# Patient Record
Sex: FEMALE | Race: WHITE | URBAN - METROPOLITAN AREA
[De-identification: names, ages, dates, MRNs, and addresses within clinical notes are randomized per-mention and may not be internally consistent; named-entity substitution may affect disease eponyms.]

---

## 2018-02-18 ENCOUNTER — HOSPITAL ENCOUNTER (OUTPATIENT)
Dept: MEDSURG UNIT | Facility: HOSPITAL | Age: 68
End: 2018-02-19
Attending: INTERNAL MEDICINE | Admitting: INTERNAL MEDICINE

## 2018-02-18 LAB
ABS NEUT (OLG): 6.89 X10(3)/MCL (ref 2.1–9.2)
ALBUMIN SERPL-MCNC: 3.5 GM/DL (ref 3.4–5)
ALBUMIN/GLOB SERPL: 0.9 RATIO (ref 1.1–2)
ALP SERPL-CCNC: 70 UNIT/L (ref 38–126)
ALT SERPL-CCNC: 30 UNIT/L (ref 12–78)
APPEARANCE, UA: NORMAL
AST SERPL-CCNC: 31 UNIT/L (ref 15–37)
BACTERIA SPEC CULT: NORMAL /HPF
BASOPHILS # BLD AUTO: 0 X10(3)/MCL (ref 0–0.2)
BASOPHILS NFR BLD AUTO: 0 %
BILIRUB SERPL-MCNC: 0.7 MG/DL (ref 0.2–1)
BILIRUB UR QL STRIP: NEGATIVE
BILIRUBIN DIRECT+TOT PNL SERPL-MCNC: 0.1 MG/DL (ref 0–0.5)
BILIRUBIN DIRECT+TOT PNL SERPL-MCNC: 0.6 MG/DL (ref 0–0.8)
BUN SERPL-MCNC: 15 MG/DL (ref 7–18)
CALCIUM SERPL-MCNC: 8.5 MG/DL (ref 8.5–10.1)
CHLORIDE SERPL-SCNC: 103 MMOL/L (ref 98–107)
CO2 SERPL-SCNC: 25 MMOL/L (ref 21–32)
COLOR UR: YELLOW
CREAT SERPL-MCNC: 0.62 MG/DL (ref 0.55–1.02)
EOSINOPHIL # BLD AUTO: 0.1 X10(3)/MCL (ref 0–0.9)
EOSINOPHIL NFR BLD AUTO: 1 %
ERYTHROCYTE [DISTWIDTH] IN BLOOD BY AUTOMATED COUNT: 12.8 % (ref 11.5–17)
GLOBULIN SER-MCNC: 3.9 GM/DL (ref 2.4–3.5)
GLUCOSE (UA): NEGATIVE
GLUCOSE SERPL-MCNC: 126 MG/DL (ref 74–106)
HCT VFR BLD AUTO: 35.9 % (ref 37–47)
HCT VFR BLD AUTO: 39.4 % (ref 37–47)
HGB BLD-MCNC: 11.8 GM/DL (ref 12–16)
HGB BLD-MCNC: 12.9 GM/DL (ref 12–16)
HGB UR QL STRIP: NEGATIVE
KETONES UR QL STRIP: NEGATIVE
LEUKOCYTE ESTERASE UR QL STRIP: NEGATIVE
LYMPHOCYTES # BLD AUTO: 1.6 X10(3)/MCL (ref 0.6–4.6)
LYMPHOCYTES NFR BLD AUTO: 17 %
MCH RBC QN AUTO: 28.9 PG (ref 27–31)
MCHC RBC AUTO-ENTMCNC: 32.7 GM/DL (ref 33–36)
MCV RBC AUTO: 88.1 FL (ref 80–94)
MONOCYTES # BLD AUTO: 0.8 X10(3)/MCL (ref 0.1–1.3)
MONOCYTES NFR BLD AUTO: 8 %
NEUTROPHILS # BLD AUTO: 6.89 X10(3)/MCL (ref 1.4–7.9)
NEUTROPHILS NFR BLD AUTO: 73 %
NITRITE UR QL STRIP: NEGATIVE
PH UR STRIP: 6 [PH] (ref 5–9)
PLATELET # BLD AUTO: 204 X10(3)/MCL (ref 130–400)
PMV BLD AUTO: 11.5 FL (ref 9.4–12.4)
POTASSIUM SERPL-SCNC: 3.8 MMOL/L (ref 3.5–5.1)
PROT SERPL-MCNC: 7.4 GM/DL (ref 6.4–8.2)
PROT UR QL STRIP: NEGATIVE
RBC # BLD AUTO: 4.47 X10(6)/MCL (ref 4.2–5.4)
RBC #/AREA URNS HPF: NORMAL /[HPF]
SODIUM SERPL-SCNC: 138 MMOL/L (ref 136–145)
SP GR UR STRIP: 1.02 (ref 1–1.03)
SQUAMOUS EPITHELIAL, UA: NORMAL
UROBILINOGEN UR STRIP-ACNC: 0.2
WBC # SPEC AUTO: 9.4 X10(3)/MCL (ref 4.5–11.5)
WBC #/AREA URNS HPF: NORMAL /[HPF]

## 2018-02-19 LAB
BUN SERPL-MCNC: 11 MG/DL (ref 7–18)
CALCIUM SERPL-MCNC: 8.1 MG/DL (ref 8.5–10.1)
CHLORIDE SERPL-SCNC: 107 MMOL/L (ref 98–107)
CO2 SERPL-SCNC: 26 MMOL/L (ref 21–32)
CREAT SERPL-MCNC: 0.62 MG/DL (ref 0.55–1.02)
ERYTHROCYTE [DISTWIDTH] IN BLOOD BY AUTOMATED COUNT: 13.1 % (ref 11.5–17)
GLUCOSE SERPL-MCNC: 94 MG/DL (ref 74–106)
HCT VFR BLD AUTO: 32.6 % (ref 37–47)
HGB BLD-MCNC: 10.9 GM/DL (ref 12–16)
MCH RBC QN AUTO: 28.3 PG (ref 27–31)
MCHC RBC AUTO-ENTMCNC: 33.4 GM/DL (ref 33–36)
MCV RBC AUTO: 84.7 FL (ref 80–94)
PLATELET # BLD AUTO: 171 X10(3)/MCL (ref 130–400)
PMV BLD AUTO: 11.8 FL (ref 9.4–12.4)
POTASSIUM SERPL-SCNC: 3.6 MMOL/L (ref 3.5–5.1)
RBC # BLD AUTO: 3.85 X10(6)/MCL (ref 4.2–5.4)
SODIUM SERPL-SCNC: 142 MMOL/L (ref 136–145)
WBC # SPEC AUTO: 7.8 X10(3)/MCL (ref 4.5–11.5)

## 2018-02-23 ENCOUNTER — HOSPITAL ENCOUNTER (OUTPATIENT)
Dept: MEDSURG UNIT | Facility: HOSPITAL | Age: 68
End: 2018-02-26
Attending: HOSPITALIST | Admitting: HOSPITALIST

## 2018-02-23 LAB
ABS NEUT (OLG): 9.42 X10(3)/MCL (ref 2.1–9.2)
APTT PPP: 27.5 SECOND(S) (ref 24.8–36.9)
BASOPHILS # BLD AUTO: 0 X10(3)/MCL (ref 0–0.2)
BASOPHILS NFR BLD AUTO: 0 %
EOSINOPHIL # BLD AUTO: 0.1 X10(3)/MCL (ref 0–0.9)
EOSINOPHIL NFR BLD AUTO: 1 %
ERYTHROCYTE [DISTWIDTH] IN BLOOD BY AUTOMATED COUNT: 13.1 % (ref 11.5–17)
HCT VFR BLD AUTO: 40.3 % (ref 37–47)
HGB BLD-MCNC: 13.4 GM/DL (ref 12–16)
INR PPP: 1.03 (ref 0–1.27)
LYMPHOCYTES # BLD AUTO: 2.1 X10(3)/MCL (ref 0.6–4.6)
LYMPHOCYTES NFR BLD AUTO: 16 %
MCH RBC QN AUTO: 28.2 PG (ref 27–31)
MCHC RBC AUTO-ENTMCNC: 33.3 GM/DL (ref 33–36)
MCV RBC AUTO: 84.8 FL (ref 80–94)
MONOCYTES # BLD AUTO: 1.2 X10(3)/MCL (ref 0.1–1.3)
MONOCYTES NFR BLD AUTO: 9 %
NEUTROPHILS # BLD AUTO: 9.42 X10(3)/MCL (ref 2.1–9.2)
NEUTROPHILS NFR BLD AUTO: 73 %
PLATELET # BLD AUTO: 223 X10(3)/MCL (ref 130–400)
PMV BLD AUTO: 11.4 FL (ref 9.4–12.4)
PROTHROMBIN TIME: 13.8 SECOND(S) (ref 12.2–14.7)
RBC # BLD AUTO: 4.75 X10(6)/MCL (ref 4.2–5.4)
WBC # SPEC AUTO: 12.9 X10(3)/MCL (ref 4.5–11.5)

## 2018-02-24 LAB
ABS NEUT (OLG): 8.72 X10(3)/MCL (ref 2.1–9.2)
BASOPHILS # BLD AUTO: 0 X10(3)/MCL (ref 0–0.2)
BASOPHILS NFR BLD AUTO: 0 %
EOSINOPHIL # BLD AUTO: 0.2 X10(3)/MCL (ref 0–0.9)
EOSINOPHIL NFR BLD AUTO: 2 %
ERYTHROCYTE [DISTWIDTH] IN BLOOD BY AUTOMATED COUNT: 13.2 % (ref 11.5–17)
HCT VFR BLD AUTO: 36 % (ref 37–47)
HGB BLD-MCNC: 11.8 GM/DL (ref 12–16)
LYMPHOCYTES # BLD AUTO: 1.8 X10(3)/MCL (ref 0.6–4.6)
LYMPHOCYTES NFR BLD AUTO: 15 %
MCH RBC QN AUTO: 29.1 PG (ref 27–31)
MCHC RBC AUTO-ENTMCNC: 32.8 GM/DL (ref 33–36)
MCV RBC AUTO: 88.7 FL (ref 80–94)
MONOCYTES # BLD AUTO: 1.1 X10(3)/MCL (ref 0.1–1.3)
MONOCYTES NFR BLD AUTO: 10 %
NEUTROPHILS # BLD AUTO: 8.72 X10(3)/MCL (ref 1.4–7.9)
NEUTROPHILS NFR BLD AUTO: 73 %
PLATELET # BLD AUTO: 209 X10(3)/MCL (ref 130–400)
PMV BLD AUTO: 11.3 FL (ref 9.4–12.4)
RBC # BLD AUTO: 4.06 X10(6)/MCL (ref 4.2–5.4)
WBC # SPEC AUTO: 11.9 X10(3)/MCL (ref 4.5–11.5)

## 2019-12-20 ENCOUNTER — HOSPITAL ENCOUNTER (OUTPATIENT)
Dept: MEDSURG UNIT | Facility: HOSPITAL | Age: 69
End: 2019-12-23
Attending: INTERNAL MEDICINE | Admitting: INTERNAL MEDICINE

## 2019-12-20 LAB
ABS NEUT (OLG): 8.74 X10(3)/MCL (ref 2.1–9.2)
APTT PPP: 29.8 SECOND(S) (ref 24.2–33.9)
BASOPHILS # BLD AUTO: 0 X10(3)/MCL (ref 0–0.2)
BASOPHILS NFR BLD AUTO: 0 %
EOSINOPHIL # BLD AUTO: 0 X10(3)/MCL (ref 0–0.9)
EOSINOPHIL NFR BLD AUTO: 0 %
ERYTHROCYTE [DISTWIDTH] IN BLOOD BY AUTOMATED COUNT: 12.4 % (ref 11.5–17)
HCT VFR BLD AUTO: 40.7 % (ref 37–47)
HGB BLD-MCNC: 13.2 GM/DL (ref 12–16)
INR PPP: 1 (ref 0–1.3)
LYMPHOCYTES # BLD AUTO: 1.3 X10(3)/MCL (ref 0.6–4.6)
LYMPHOCYTES NFR BLD AUTO: 12 %
MCH RBC QN AUTO: 28.7 PG (ref 27–31)
MCHC RBC AUTO-ENTMCNC: 32.4 GM/DL (ref 33–36)
MCV RBC AUTO: 88.5 FL (ref 80–94)
MONOCYTES # BLD AUTO: 0.3 X10(3)/MCL (ref 0.1–1.3)
MONOCYTES NFR BLD AUTO: 3 %
NEUTROPHILS # BLD AUTO: 8.74 X10(3)/MCL (ref 2.1–9.2)
NEUTROPHILS NFR BLD AUTO: 84 %
PLATELET # BLD AUTO: 202 X10(3)/MCL (ref 130–400)
PMV BLD AUTO: 11.7 FL (ref 9.4–12.4)
PROTHROMBIN TIME: 12.9 SECOND(S) (ref 12–14)
RBC # BLD AUTO: 4.6 X10(6)/MCL (ref 4.2–5.4)
WBC # SPEC AUTO: 10.4 X10(3)/MCL (ref 4.5–11.5)

## 2019-12-21 LAB
ABS NEUT (OLG): 6.39 X10(3)/MCL (ref 2.1–9.2)
ALBUMIN SERPL-MCNC: 3.5 GM/DL (ref 3.4–5)
ALBUMIN/GLOB SERPL: 1 RATIO (ref 1.1–2)
ALP SERPL-CCNC: 64 UNIT/L (ref 38–126)
ALT SERPL-CCNC: 31 UNIT/L (ref 12–78)
APTT PPP: 31 SECOND(S) (ref 24.2–33.9)
AST SERPL-CCNC: 24 UNIT/L (ref 15–37)
BASOPHILS # BLD AUTO: 0 X10(3)/MCL (ref 0–0.2)
BASOPHILS NFR BLD AUTO: 0 %
BILIRUB SERPL-MCNC: 0.5 MG/DL (ref 0.2–1)
BILIRUBIN DIRECT+TOT PNL SERPL-MCNC: 0.1 MG/DL (ref 0–0.5)
BILIRUBIN DIRECT+TOT PNL SERPL-MCNC: 0.4 MG/DL (ref 0–0.8)
BUN SERPL-MCNC: 17 MG/DL (ref 7–18)
CALCIUM SERPL-MCNC: 8.9 MG/DL (ref 8.5–10.1)
CHLORIDE SERPL-SCNC: 104 MMOL/L (ref 98–107)
CO2 SERPL-SCNC: 27 MMOL/L (ref 21–32)
CREAT SERPL-MCNC: 0.66 MG/DL (ref 0.55–1.02)
EOSINOPHIL # BLD AUTO: 0 X10(3)/MCL (ref 0–0.9)
EOSINOPHIL NFR BLD AUTO: 0 %
ERYTHROCYTE [DISTWIDTH] IN BLOOD BY AUTOMATED COUNT: 12.5 % (ref 11.5–17)
GLOBULIN SER-MCNC: 3.5 GM/DL (ref 2.4–3.5)
GLUCOSE SERPL-MCNC: 118 MG/DL (ref 74–106)
HCT VFR BLD AUTO: 38.4 % (ref 37–47)
HGB BLD-MCNC: 12.2 GM/DL (ref 12–16)
INR PPP: 1 (ref 0–1.3)
LYMPHOCYTES # BLD AUTO: 2.4 X10(3)/MCL (ref 0.6–4.6)
LYMPHOCYTES NFR BLD AUTO: 25 %
MCH RBC QN AUTO: 28.1 PG (ref 27–31)
MCHC RBC AUTO-ENTMCNC: 31.8 GM/DL (ref 33–36)
MCV RBC AUTO: 88.5 FL (ref 80–94)
MONOCYTES # BLD AUTO: 0.8 X10(3)/MCL (ref 0.1–1.3)
MONOCYTES NFR BLD AUTO: 8 %
NEUTROPHILS # BLD AUTO: 6.39 X10(3)/MCL (ref 2.1–9.2)
NEUTROPHILS NFR BLD AUTO: 65 %
PLATELET # BLD AUTO: 183 X10(3)/MCL (ref 130–400)
PMV BLD AUTO: 11.7 FL (ref 9.4–12.4)
POTASSIUM SERPL-SCNC: 4.1 MMOL/L (ref 3.5–5.1)
PROT SERPL-MCNC: 7 GM/DL (ref 6.4–8.2)
PROTHROMBIN TIME: 13.4 SECOND(S) (ref 12–14)
RBC # BLD AUTO: 4.34 X10(6)/MCL (ref 4.2–5.4)
SODIUM SERPL-SCNC: 140 MMOL/L (ref 136–145)
WBC # SPEC AUTO: 9.8 X10(3)/MCL (ref 4.5–11.5)

## 2019-12-23 LAB
ERYTHROCYTE [DISTWIDTH] IN BLOOD BY AUTOMATED COUNT: 12.8 % (ref 11.5–17)
HCT VFR BLD AUTO: 39.2 % (ref 37–47)
HGB BLD-MCNC: 12.4 GM/DL (ref 12–16)
MCH RBC QN AUTO: 28.6 PG (ref 27–31)
MCHC RBC AUTO-ENTMCNC: 31.6 GM/DL (ref 33–36)
MCV RBC AUTO: 90.5 FL (ref 80–94)
PLATELET # BLD AUTO: 204 X10(3)/MCL (ref 130–400)
PMV BLD AUTO: 11.7 FL (ref 9.4–12.4)
RBC # BLD AUTO: 4.33 X10(6)/MCL (ref 4.2–5.4)
WBC # SPEC AUTO: 9.1 X10(3)/MCL (ref 4.5–11.5)

## 2022-04-30 NOTE — ED PROVIDER NOTES
"   Patient:   Sandor Rea            MRN: [id number]            FIN: [id number]               Age:   67 years     Sex:  Female     :  1950   Associated Diagnoses:   Epistaxis   Author:   Yolette LEVIN, Jerri Blair      Basic Information   Time seen: Date & time 2018 08:57:00.   History source: Patient, friend.   Arrival mode: Private vehicle.   History limitation: None.   Additional information: Chief Complaint from Nursing Triage Note : Chief Complaint   2018 7:35 CST       Chief Complaint           pt reports she has been bleeding out of eyes and nose x 1 week, recently prescribed BP medicine by an ER and was told by PCP to stop taking it "because she did not like it", reports vomiting blood clots since this morning, denies thinners  .      History of Present Illness   The patient presents with 68 y/o F presents to the ED with friend at bedside c/o of epistaxis x 1 week. Nose currently not actively bleeding however she reports dried blood clots in the back of her throat. She has been evaluated for the same symptoms twice this past week at [facility] ED as well as by her PCP, but continues to have reported nose bleeds. She is also reporting nausea that she attributes to swallowing blood. At her initial ED visit her BP was high. She was started on Clonidine but is only taking it for SBP >150 mmHg per instruction of her PCP. She has no history of HTN otherwise. She took one dose of clonidine this morning when her nose began to ooze. She applied pressure, sprayed afrin then returned to the ED here for evaluation. Again, her nose is currently not bleeding. She had a similar issue in the past requiring surgery by her ENT in [place], Dr. Dakin. She is not on anticoagualation therapy and denies any other abnormal bleeding. She reported at triage that blood was also coming from her eyes, however this was earlier in the week with the initial nosebleed, not currenlty. She reports feeling lightheaded at " this time but attributes that to being hungry. No syncope, CP, SOB or other acute issues..  The onset was 6  days ago.  The course/duration of symptoms is improving and episodic: 3  total episodes.  The character of symptoms is bloody.  The degree at present is moderate.  The exacerbating factor is none.  The relieving factor is none.  Risk factors consist of none.  Therapy today: clonidine, direct pressure, afrin.  Associated symptoms: dizziness and nausea.  Additional history: none.        Review of Systems   Constitutional symptoms:  No fever,    Skin symptoms:  No rash,    Eye symptoms:  Vision unchanged, No recent vision problems,    ENMT symptoms:  No sore throat, Nose: Bleeding.   Respiratory symptoms:  No shortness of breath,    Cardiovascular symptoms:  No chest pain, no syncope.    Gastrointestinal symptoms:  Nausea, no abdominal pain, no vomiting, no diarrhea, no constipation, no rectal bleeding.    Genitourinary symptoms:  No dysuria, no hematuria, no vaginal bleeding.    Neurologic symptoms:  Dizziness, No headache,    Psychiatric symptoms:  Anxiety.   Hematologic/Lymphatic symptoms:  Bleeding tendency negative,       Health Status   Allergies:    Allergic Reactions (All)  Severity Not Documented  Codeine- Rash.  Percodan- Rash.  Sulfa drugs- Rash.  Canceled/Inactive Reactions (All)  No Known Allergies.   Medications:  (Selected)   Inpatient Medications  Ordered  IVF Normal Saline NS Infusion 1,000 mL: 1,000 mL, 1,000 mL, IV, 30 mL/hr, start date 02/18/18 9:04:00 CST  Prescriptions  Prescribed  cloniDINE 0.1 mg oral tablet: 0.1 mg = 1 tab(s), Oral, QID, PRN PRN hypertension, use 1 tablet every 6 hours as needed if systolic blood pressure is greater than 150, # 20 tab(s), 2 Refill(s).      Past Medical/ Family/ Social History   Medical history: Negative.   Surgical history: Negative.   Family history: Not significant.   Social history: Alcohol use: Denies, Tobacco use: Denies.      Physical Examination                Vital Signs   Vital Signs   2/18/2018 8:11 CST       Peripheral Pulse Rate     89 bpm                             Respiratory Rate          18 br/min                             SpO2                      95 %                             Oxygen Therapy            Room air                             Systolic Blood Pressure   121 mmHg                             Diastolic Blood Pressure  71 mmHg                             Mean Arterial Pressure, Cuff              88 mmHg    2/18/2018 8:10 CST       SpO2                      95 %    2/18/2018 7:35 CST       Temperature Oral          36.7 DegC                             Temperature Oral (calculated)             98.06 DegF                             Peripheral Pulse Rate     91 bpm                             Respiratory Rate          18 br/min                             SpO2                      96 %                             Oxygen Therapy            Room air                             Systolic Blood Pressure   139 mmHg                             Diastolic Blood Pressure  86 mmHg  .   Measurements   2/18/2018 7:35 CST       Weight Dosing             90.5 kg                             Weight Measured and Calculated in Lbs     199.52 lb                             Weight Estimated          90.5 kg                             Height/Length Dosing      173 cm                             Height/Length Estimated   173 cm                             Body Mass Index Estimated 30.24 kg/m2  .   Basic Oxygen Information   2/18/2018 8:11 CST       SpO2                      95 %                             Oxygen Therapy            Room air    2/18/2018 8:10 CST       SpO2                      95 %    2/18/2018 7:35 CST       SpO2                      96 %                             Oxygen Therapy            Room air  .   General:  Alert, no acute distress, anxious.    Skin:  Warm, dry, pink, intact, no pallor, no rash.    Head:  Normocephalic, atraumatic.     Neck:  Supple, trachea midline.    Eye:  Extraocular movements are intact, normal conjunctiva.    Ears, nose, mouth and throat:  Oral mucosa moist, lips are dry, Nose: Right naris, scab to anterior aspect, Throat: Pharynx, scant amount of dried blood to posterior pharynx without active bleeding noted. Patient noted to continue to wipe her nose.    Cardiovascular:  Regular rate and rhythm, Normal peripheral perfusion.    Respiratory:  Lungs are clear to auscultation, respirations are non-labored, breath sounds are equal, Symmetrical chest wall expansion.    Gastrointestinal:  Soft, Nontender, Non distended, Normal bowel sounds.    Back:  Normal range of motion.   Musculoskeletal:  Normal ROM.   Neurological:  Alert and oriented to person, place, time, and situation, No focal neurological deficit observed, normal sensory observed, normal motor observed.    Psychiatric:  Cooperative.      Medical Decision Making   Differential Diagnosis:  Nausea, gastritis, bowel obstruction, dehydration, electrolyte abnormality, HTN with epistaxis, traumatic epistaxis and others. .    Rationale:  66 yo F with multiple visits for mild epistaxis that appears to be from an anterior source in the right nostril. Bleeding currentlly controlled. She is reporting nausea and lightheadedness. Repeat CBC obtained to assess for acute anemia although it is unlikely. Labs and coagulation studies obtained the day prior to presentation to me and are wnl. Repeat Hb here stable. Patient given zofran for nausea and observed for a period of time for recurrent bleeding. BP also monitored. Reassess.   Documents reviewed:  Emergency department nurses' notes.   Orders  Laboratory    CBC w/ Auto Diff, Yolette LEVIN, Jerri Blair, 02/18/18, 09:04, Ordered    CMP, Jerri De La Vega MD, 02/18/18, 09:04, Ordered    Urinalysis Complete a reflex to culture, Jerri De La Vega MD, 02/18/18, 09:04, Ordered  EKG / Respiratory / Ancillary    EKG, Yolette LEVIN,  Jerri Blair, 02/18/18, 09:16, Ordered .   Electrocardiogram:  Time 2/18/2018 09:16:00, rate 81, normal sinus rhythm, No ST-T changes, no ectopy, EP Interp, minimally shortened FL interval, otherwise intervals wnl.    Results review:  Lab results : Lab View   2/19/2018 5:10 CST       Sodium Lvl                142 mmol/L                             Potassium Lvl             3.6 mmol/L                             Chloride                  107 mmol/L                             CO2                       26.0 mmol/L                             Calcium Lvl               8.1 mg/dL  LOW                             Glucose Lvl               94 mg/dL                             BUN                       11.0 mg/dL                             Creatinine                0.62 mg/dL                             eGFR-AA                   >60 mL/min/1.73 m2  NA                             eGFR-MALVIN                  >60 mL/min/1.73 m2  NA                             WBC                       7.8 x10(3)/mcL                             RBC                       3.85 x10(6)/mcL  LOW                             Hgb                       10.9 gm/dL  LOW                             Hct                       32.6 %  LOW                             Platelet                  171 x10(3)/mcL                             MCV                       84.7 fL                             MCH                       28.3 pg                             MCHC                      33.4 gm/dL                             RDW                       13.1 %                             MPV                       11.8 fL  .   Abdominal/KUB X-ray  Nonspecific bowel gas pattern, interpretation by Emergency Physician.       Reexamination/ Reevaluation   Time: 2/18/2018 10:00:00 .   Course: unchanged.   Assessment: exam unchanged.   Notes: Patient still reporting nausea. Asking for water. No bleeding noted. She is concerned about going home regarding continued nose bleed  and uncertainty about her BP. BP has been controlled here without intervention. She can see her PCP Monday for further evaluation and is advised to do so. I did contact Dr. Mccormick, on call for Dr. Dakin, regarding her case to  ensure ENT follow-up and Dr. Mccormick assures me they can see her first thing Monday morning. He recommends placing anterior nasal packing for the evening and he or Dr. Dakin will re-evaluate and remove it in the morning. Packing was placed in the right naris without complication. Will attempt PO challenge..   Time: 2/18/2018 12:00:00 .   Course: unchanged.   Assessment: exam unchanged.   Notes: Patient still reporting nausea. She drank a sip of water and tolerated it but does not want to drink any more secondary to nausea. She remains hemodynamically stable without bleeding. IV initiated with IVF and phenergan given. Reassess..   Time: 2/18/2018 13:00:00 .   Notes: Patient still refusing to drink. Again, she remains hemodynamically stable. Scant fresh blood noted to the posterior pharynx. As she is not tolerating PO, I consulted Dr. Blaise Cordoba with ENT as well as hospital medicine for admission for observation of nose bleed along with hydration. I don't believe she will have bleeding recurrence and I don't believe her nausea is secondary to the packing. She remains afebrile with a soft, nontender abdomen. AXR obtained in an exercise of caution to ensure no obstruction. It is negative for such. Medicine agrees to admit and Dr. Cordoba will be available for consultation in the case she bleeds or has other issues with packing. Patient's BP will also be monitored. She is amenable to admission.      Impression and Plan   Diagnosis   Epistaxis (LIT79-GQ R04.0)   intractable nausea      Calls-Consults   -  2/18/2018 10:05:00 , Dakin MD, Kim.    -  2/18/2018 13:05:00 , Adalid Garcia MD, shankar Leary.    -  2/18/2018 13:10:00 , Marjorie Pinzon MD, paged.    -  2/18/2018 13:30:00 , Marjorie Pinzon MD  shankar.    Plan   Condition: Stable.    Disposition: Admit time  2/18/2018 13:00:00, Place in Observation Unit, Ayde Carranza MD.    Counseled: Patient, Friend, Regarding diagnosis, Regarding diagnostic results, Regarding treatment plan, Patient indicated understanding of instructions.    Notes: I, Gabriella Martinez, acted solely as a scribe for and in the presence of Dr. De La Vega who performed the service., I, Dr. Jerri De La Vega, personally evaluated and examined this patient and agree with the documentation as above. .

## 2022-04-30 NOTE — ED PROVIDER NOTES
"   Patient:   Sandor Rea            MRN: [id number]            FIN: [id number]               Age:   69 years     Sex:  Female     :  1950   Associated Diagnoses:   Acute anterior epistaxis; N&V (nausea and vomiting)   Author:   Kary Nixon MD      Basic Information   Time seen: Date & time 2019 17:55:00.   History source: Patient.   Arrival mode: Ambulance.   History limitation: None.   Additional information: Patient's physician(s): Adalid Garcia MD, Austin VILLEGAS, Dr. Hooks, Chief Complaint from Nursing Triage Note : Chief Complaint   2019 17:44 CST     Chief Complaint           nose bleed since 1640; denies blood thinners, denies trauma  .      History of Present Illness   The patient presents with epistaxis, Pt brought to Ed by EMS for nose bleed that began about one hour ago.  Pt with hx of post bleed several years ago.  Not on any thinners.  (Carlito CHILEL) and     I, Dr. Nixon, assumed care of this patient at 1915.    70 y/o CF with history of HTN presents to ED with epistaxis onset approximately 1640. Pt states that she has experienced nosebleeds before out of the right nostril. Pt states that in the past they had to pack the nostril and that they used a "foam" that stopped the bleeding. Today's therapy included applying pressure for 3 hours which did not work and then doing two sprays of Afrin in each nostril, which also did not work. Pt denies blowing nose or picking at her nose. .  The onset was 3  hours ago.  The course/duration of symptoms is constant.  Location: right. Type of injury: none.  The degree at onset was moderate.  The degree at present is moderate.  Risk factors consist of hypertension.  Prior episodes: occasional.  Therapy today: emergency medical services and see nurses notes.  Associated symptoms: none.  Additional history: none.        Review of Systems   Constitutional symptoms:  No fever, no chills, no sweats   Skin symptoms:  No rash, no petechiae.    Eye " symptoms:  Vision unchanged, no pain, no discharge, no icterus, no diplopia, no blurred vision, no blindness.    ENMT symptoms:  No ear pain, no sore throat, no nasal congestion, no sinus painNose: Bleeding.   Respiratory symptoms:  No shortness of breath, no orthopnea, no cough, no hemoptysis, no stridor, no wheezing.    Cardiovascular symptoms:  No chest pain, no palpitations, no syncope, no diaphoresis, no peripheral edema.    Gastrointestinal symptoms:  No abdominal pain, no nausea, no vomiting, no diarrhea, no constipation, no rectal bleeding, no rectal pain.    Genitourinary symptoms:  No dysuria, no hematuria.    Musculoskeletal symptoms:  No back pain, no Muscle pain.    Neurologic symptoms:  No headache, no dizziness, no altered level of consciousness, no numbness, no tingling   Psychiatric symptoms:  Negative except as documented in HPI.   Endocrine symptoms:  Negative except as documented in HPI.   Hematologic/Lymphatic symptoms:  Negative except as documented in HPI      Health Status   Allergies:    Allergic Reactions (Selected)  Severity Not Documented  Codeine- Rash.  Opioid-like analgesics- Hives.  Percodan- Rash.  Sulfa drugs- Rash..   Medications:  (Selected)   Prescriptions  Prescribed  Flomax 0.4 mg oral capsule: 0.4 mg = 1 cap(s), Oral, Daily, # 15 cap(s), 0 Refill(s), Pharmacy: [place] [facility] [id number]  Mobic 7.5 mg oral tablet: 7.5 mg = 1 tab(s), Oral, q12hr, PRN PRN pain, # 20 tab(s), 0 Refill(s), Pharmacy: [id number]  Documented Medications  Documented  MiraLax (polyethylene glycol 3350): 17 gm = 1 packet(s), Oral, BID, 0 Refill(s)  Zofran 4 mg oral tablet: 4 mg = 1 tab(s), Oral, q8hr, PRN PRN as needed for nausea/vomiting, 0 Refill(s)  alPRAzolam 0.5 mg oral tablet: 0.5 mg = 1 tab(s), Oral, TID, 0 Refill(s)  amlodipine-benazepril 5 mg-10 mg oral capsule: 1 cap(s), Oral, Daily, 0 Refill(s)  cloniDINE 0.1 mg oral tablet: PRN for BP > 160/100, 0 Refill(s), per  nurse's notes.      Past Medical/ Family/ Social History   Medical history:    No active or resolved past medical history items have been selected or recorded..   Surgical history:    sx for nose bleeds.   delivery ([id number]).  right arm sx..   Family history:    No family history items have been selected or recorded..   Social history: Tobacco use: Denies.   Problem list:    No qualifying data available  .      Physical Examination               Vital Signs   Vital Signs   2019 17:44 CST     Temperature Temporal Artery               36.6 DegC                             Peripheral Pulse Rate     112 bpm  HI                             Respiratory Rate          18 br/min                             SpO2                      99 %                             Oxygen Therapy            Room air                             Systolic Blood Pressure   186 mmHg  HI                             Diastolic Blood Pressure  119 mmHg  HI  .   Measurements   2019 17:44 CST     Weight Dosing             85 kg                             Weight Measured and Calculated in Lbs     187.39 lb                             Weight Estimated          85 kg                             Height/Length Dosing      172 cm                             Height/Length Estimated   172 cm                             Body Mass Index Estimated 28.73 kg/m2  .   General:  Alert, no acute distress.    Neurological:  Alert and oriented to person, place, time, and situation, No focal neurological deficit observed   Skin:  Warm, dry, intact   Head:  Normocephalic, atraumatic.    Neck:  Supple, trachea midline, no tenderness.    Eye:  Pupils are equal, round and reactive to light, extraocular movements are intact   , continued oozing from right nares. Respiratory:  Lungs are clear to auscultation, respirations are non-labored   Cardiovascular:  Regular rate and rhythm, No murmur, Normal peripheral perfusion.    Back:  Nontender, Normal range  of motion   Musculoskeletal:  Normal ROM, normal strength.    Gastrointestinal:  Soft, Nontender   Psychiatric:  Cooperative, appropriate mood & affect.       Medical Decision Making   Differential Diagnosis:  Anterior epistaxis, hypertension.    Rationale:  pt presenting with epistaxis, has had similar symptoms in the past that sarina required admit. at this time bleeding ahs been controlled with packing. .   Documents reviewed:  Emergency department nurses' notes, emergency department records, prior records.    Orders  Launch Order Profile (Selected)   Inpatient Orders  Ordered  neosynephrine: 12/20/19 18:00:00 CST, neosynephrine  Completed  PT ([facility]): STAT collect, 12/20/19 18:48:52 CST, BLOOD, Collected, Stop date 12/20/19 18:48:00 CST, Lab Collect  PTT: STAT collect, 12/20/19 18:48:52 CST, BLOOD, Collected, Stop date 12/20/19 18:48:00 CST, Lab Collect  ondansetron INJ: 4 mg, 2 mL, Injection, N/A, Once, Stop date 12/20/19 19:10:15 CST, Physician Stop, 12/20/19 19:10:15 CST  ondansetron: 4 mg, form: Injection, IV, AdHoc, first dose 12/20/19 19:12:00 CST, stop date 12/20/19 19:12:00 CST  oxymetazoline nasal: 2 spray(s), Spray, N/A, Once, Stop date 12/20/19 18:25:39 CST, Physician Stop, 12/20/19 18:25:39 CST  oxymetazoline nasal: 2 spray(s), form: Spray, Both Nostrils, AdHoc, first dose 12/20/19 18:28:00 CST, stop date 12/20/19 18:28:00 CST.   Results review:  Lab results : Lab View   12/20/2019 18:48 CST     PT                        12.9 second(s)                             INR                       1.0                             PTT                       29.8 second(s)  .      Reexamination/ Reevaluation   Time: 12/20/2019 21:51:00 .   Notes: no further bleeding from right nare, will continue to monitor due to patient's significant concern regarding rbebleeding, did have emesis of swallowed blood that did not result in further bleeding.   Time: 12/20/2019 23:07:00 .   Notes: patient has not had more  bleeding, she states she is so nauseous from swallowed blood and does not feel comfortable trying to drink or tolerate po and does not feel comfortable with dc.      Procedure   Nose bleed control procedure   Time: 12/20/2019 20:00:00 .    Confirmed: Patient, procedure, side, and site correct.    Consent: Patient, Has given verbal consent.    Location of bleeding: Right nasal canal, Anteriorly.    Preparation: External pressure.       Description   Attempt: # 1.   Packing: right, anterior.   Post procedure bleeding: Minimal.    Patient tolerated: Well.    Complications: None.    Follow-up: Ear nose and throat physician.    Performed by: Self.    Total time: 10 minutes.       Impression and Plan   Diagnosis   Acute anterior epistaxis ([id number] R04.0)   N&V (nausea and vomiting) (TME01-NM R11.2)      Calls-Consults   -  12/20/2019 23:12:00 .   Plan   Condition: Improved.    Disposition: Admit time  12/20/2019 23:18:00, Place in Observation Unit.    Patient was given the following educational materials: Nosebleed, Easy-to-Read, Nosebleed, Easy-to-Read.    Follow up with: Follow-up with PCP in 3 to 5 days; Austin Cordoba Jr; Report to Emergency Department if symptoms return or worsen.    Counseled: Patient, Family, Regarding diagnosis, Regarding diagnostic results, Regarding treatment plan, Patient indicated understanding of instructions.    Orders: Dominga MASTERS, acted solely as a scribe for and in the presence of Dr. Nixon who performed the service. .    Notes: Ginny MASTERS, acted solely as a scribe for and in the presence of Dr. Nixon who performed the service. .       Addendum   I, Kary Nixon MD, performed the history, physical examination, MDM and procedures as above and agree with the scribe's documentation

## 2022-04-30 NOTE — H&P
"   Patient:   Sandor Rea            MRN: [id number]            FIN: [id number]               Age:   67 years     Sex:  Female     :  1950   Associated Diagnoses:   None   Author:   Ayde Carranza MD      Basic Information   Source of history:  Self, Medical record.    Present at bedside:  Not family member.    Referral source:  Emergency department.    History limitation:  None.    Provider information/ cc:    PCP: KRIS LAM  ADVANCED DIRECTIVES: NONE  CODE STATUS: FULL.       Chief Complaint       2018 7:35 CST       pt reports she has been bleeding out of eyes and nose x 1 week, recently prescribed BP medicine by an ER and was told by PCP to stop taking it "because she did not like it", reports vomiting blood clots since this morning, denies thinners    2018 8:38 CST       Nosedbleed        History of Present Illness   67 year old  female presents to the emergency department with complaints of nosebleed over the past week.  Patient was seen in the ED ×2 episodes in [facility] and was seen by her PCP for the same problems as well.  She reports she was prescribed clonidine from one her ED visits at [facility] to help with her blood pressure and the nosebleeds.  She denies any injury or trauma and is not on any blood thinners or anticoagulation therapy.  She denies any chronic medical problems and reports no daily chronic medication.  In reviewing her medical record patient was seen in the ED at [facility] 2018, followed up with her PCP and then subsequently return to the ED at [facility] on 2018 for continued nosebleeds, vomiting blood, and associated nausea.  Patient reports she has not been taking the clonidine as she was instructed to stop taking the medication per her PCP.  She denies any past medical history of hypertension.  Denies any dizziness, syncopal episodes, bright red bleeding per rectum, black tarry stools, hematochezia, or melena.  Labwork done in the " ED revealed Sodium 138, potassium 3.8, chloride 103; glucose 126, BUN 15.0, creatinine 0.62; bili level liver enzymes unremarkable; coagulation unremarkable; WBCs 9.4, H&H 12.9/39.4, platelets 204.  Patient reported fever and high blood pressure at home however she has been afebrile with all of her ED visits and is currently afebrile today with normotensive blood pressure.  ED provider placed nasal packing to right nare and spoke with ENT on call for Dr. Dakin with reports to instruct pt to call office in the am for appointment. Attempt was made to d/c patient , however, she reports nausea and unable to drink secondary to the nausea. Pt is admitted to the hospitalist services for further management. ENT consulted per ED.  Initial VS /86 P 91 R 18 T 36.7C O2 saturation 96% on room air      Review of Systems   Except as document, all other systems reviewed and negative      Health Status   Allergies:    Allergic Reactions (Selected)  Severity Not Documented  Codeine- Rash.  Percodan- Rash.  Sulfa drugs- Rash.   Current medications:  (Selected)   Prescriptions  Prescribed  cloniDINE 0.1 mg oral tablet: 0.1 mg = 1 tab(s), Oral, QID, PRN PRN hypertension, use 1 tablet every 6 hours as needed if systolic blood pressure is greater than 150, # 20 tab(s), 2 Refill(s)      Histories   Past Medical History:   Negative   Family History:   Negative to present medication   Procedure history:    section, right arm surgery, surgery for nosebleeds in the past   Social History     Denies any alcohol, tobacco, or illicit drug use  Lives alone.        Physical Examination      Vital Signs (last 24 hrs)_____  Last Charted___________  Temp Oral     36.7 DegC  ( 07:35)  Heart Rate Peripheral   82 bpm  ( 13:01)  Resp Rate         16 br/min  ( 13:)  SBP      123 mmHg  ( 13:)  DBP      69 mmHg  (:)  SpO2      94 %  ( 13:)     General:  Mild distress, Drowsy for examination; Appears  stated age, nontoxic appearance; no family at the bedside.    Eye:  Pupils are equal, round and reactive to light, Extraocular movements are intact, Vision unchanged.    HENT:  Normocephalic, Oral mucosa is moist, No pharyngeal erythema.    Neck:  Supple, Non-tender.    Respiratory:  Lungs are clear to auscultation, Respirations are non-labored, Breath sounds are equal, Symmetrical chest wall expansion.    Cardiovascular:  Normal rate, Regular rhythm, S1, S2, No gallop, Normal peripheral perfusion.         Capillary refill: Less than 2 seconds.    Gastrointestinal:  Soft, Non-distended, Normal bowel sounds.         Abdomen: All four quadrants, Tenderness, No rebound tenderness.    Genitourinary:  No costovertebral angle tenderness.    Musculoskeletal:  Moves upper and lower extremities freely and on command; generalized weakness.    Integumentary:  Warm, Dry, Intact.    Neurologic:  Oriented, Normal sensory, Normal motor function, No focal deficits.         Orientation: Oriented X 4.         Altered level of consciousness: Drowsy.    Psychiatric:  Cooperative, Appropriate mood & affect.    Cognition and Speech:  Speech clear and coherent.       Review / Management   Results review:     Labs (Last four charted values)  Glucose              H 126 (FEB 18) .    Laboratory Results   Today's Lab Results : Power[facility] Discrete Results   2/18/2018 9:25 CST       WBC                       9.4 x10(3)/mcL                             RBC                       4.47 x10(6)/mcL                             Hgb                       12.9 gm/dL                             Hct                       39.4 %                             Platelet                  204 x10(3)/mcL                             MCV                       88.1 fL                             MCH                       28.9 pg                             MCHC                      32.7 gm/dL  LOW                             RDW                       12.8 %                              MPV                       11.5 fL                             Abs Neut                  6.89 x10(3)/mcL                             Neutro Auto               73 %  NA                             Lymph Auto                17 %  NA                             Mono Auto                 8 %  NA                             Eos Auto                  1 %  NA                             Abs Eos                   0.1 x10(3)/mcL                             Basophil Auto             0 %  NA                             Abs Neutro                6.89 x10(3)/mcL                             Abs Lymph                 1.6 x10(3)/mcL                             Abs Mono                  0.8 x10(3)/mcL                             Abs Baso                  0.0 x10(3)/mcL                             Sodium Lvl                138 mmol/L                             Potassium Lvl             3.8 mmol/L                             Chloride                  103 mmol/L                             CO2                       25.0 mmol/L                             Calcium Lvl               8.5 mg/dL                             Glucose Lvl               126 mg/dL  HI                             BUN                       15.0 mg/dL                             Creatinine                0.62 mg/dL                             eGFR-AA                   >60 mL/min/1.73 m2  NA                             eGFR-MALVIN                  >60 mL/min/1.73 m2  NA                             Bili Total                0.7 mg/dL                             Bili Direct               0.10 mg/dL                             Bili Indirect             0.60 mg/dL                             AST                       31 unit/L                             ALT                       30 unit/L                             Alk Phos                  70 unit/L                             Total Protein             7.4 gm/dL                             Albumin Lvl                3.50 gm/dL                             Globulin                  3.90 gm/dL  HI                             A/G Ratio                 0.9 ratio  LOW    2/17/2018 8:52 CST       WBC                       9.2 x10(3)/mcL                             RBC                       4.68 x10(6)/mcL                             Hgb                       13.2 gm/dL                             Hct                       39.6 %                             Platelet                  210 x10(3)/mcL                             MCV                       84.6 fL                             MCH                       28.2 pg                             MCHC                      33.3 gm/dL                             RDW                       12.5 %                             MPV                       11.5 fL  HI                             Abs Neut                  6.9 x10(3)/mcL                             Neutro Auto               75.5 %  HI                             Lymph Auto                17.0 %  LOW                             Mono Auto                 6.1 %                             Eos Auto                  0.7 %                             Abs Eos                   0.1 x10(3)/mcL                             Basophil Auto             0.3 %                             Abs Neutro                6.9 x10(3)/mcL                             Abs Lymph                 1.6 x10(3)/mcL                             Abs Mono                  0.6 x10(3)/mcL                             Abs Baso                  0.0 x10(3)/mcL                             IG%                       0.4 %                             IG#                       0                             PT                        13.0 second(s)                             INR                       1.14                             PTT                       28.7 second(s)        Documentation reviewed:  Reviewed prior records, Reviewed home medications.    Condition:   Stable.       Impression and Plan   Diagnosis       Acute epistaxis- right nare  -Status post nasal packing placement  -Serial H&H  -Repeat lab work in a.m.  -ENT has been consulted    Nausea with vomiting  -PRN anti-emetic therapy  -IV hydration NS at 75 ml/hr  -repeat labs in the am    Abdominal pain- generalized  -encourage PO fluids  -PRN pain management  -flat/erect ordered and pending    HTN-essential  -currently normotensive  -will add 5 mg norvasc daily  -d/c clonidine    Anxiety  -PRN xanax BID      FULL CODE STATUS  GI/DVT PROPHYLAXIS  PCP: Willis LEDBETTER APRN, FNP, discussed the case with Dr. JUSTINE Carranza. .     Orders     Orders   Laboratory:  H&H (Order): Timed collect, 2/18/2018 18:00 CST, Blood, Lab Collect, 2/18/2018 18:00 CST  BMP (Order): Routine collect, 2/19/2018 5:00 CST, Blood, Lab Collect, 2/19/2018 5:00 CST  CBC wo/Diff (Order): Routine collect, 2/19/2018 5:00 CST, Blood, Lab Collect, 2/19/2018 5:00 CST  Pharmacy:  Xanax 0.25 mg oral tablet (Order): 0.25 mg, form: Tab, Oral, BID PRN for as needed for anxiety, first dose 2/18/2018 14:30 CST.     Education and Follow-up:       Counseled: Patient, Regarding diagnosis, Regarding treatment, Regarding medications.       Professional Services   i jose miguel Del Rosario care of this patient at 3 PM  For this patient encounter I reviewed NP's documentation, treatment plan and medical decision making.  I had a face-to-face time with this patient  67-year-old white female who has been to ER twice in the [facility] because of nosebleed.  Patient's blood pressure was found to be elevated so she was sent home on some blood pressure medication but then she went to see her primary care physician who asked her to stop those medications.  Again this morning she had an episode of nosebleed so she decided to come to the ER.  Patient is status post nasal packing done in the ER bleeding has stopped but patient is also  complaining of some nausea so flat and erect x-ray was ordered that showed benign abdomen she's been admitted to hospitalist service for observation and ENT has been consulted  Gen. alert awake oriented  Abdomen soft nontender bowel sounds present    Plan  We'll keep the packing on.   for hypertension we will start the patient on Norvasc

## 2022-04-30 NOTE — DISCHARGE SUMMARY
Patient:   Sandor Rea            MRN: [id number]            FIN: [id number]               Age:   67 years     Sex:  Female     :  1950   Associated Diagnoses:   Essential hypertension; Recurrent epistaxis   Author:   Mansoor Steven MD      Discharge Information      Discharge Summary Information   ADMIT/DISCHARGE DATE   Admit Date: 2018  Discharge Date: 2018     Physicians   Attending Physician - Maurizio LEVIN, Mansoor POND  Admitting Physician - Maurizio LEVIN, Mansoor POND  Consulting Physician - Adalid Garcia MD, Austin VILLEGAS  Consulting Physician - Maurizio LEVIN, Mansoor POND  Primary Care Physician - Sukhdev Remy MD     Discharge Diagnosis   Epistaxis (R04.0)   Essential (primary) hypertension (I10)      Procedures   No procedures recorded for this visit.   Immunizations   No immunizations recorded for this visit.     Discharge Medications   Prescribed  polyethylene glycol 3350 (MiraLax (polyethylene glycol 3350)) 17 gm, Oral, BID  Continue  alPRAzolam (alPRAzolam 0.5 mg oral tablet) 0.5 mg, Oral, TID  amlodipine-benazepril (amlodipine-benazepril 5 mg-10 mg oral capsule) 1 cap(s), Oral, Daily  cloNIDine (cloniDINE 0.1 mg oral tablet)  ondansetron (Zofran 4 mg oral tablet) 4 mg, Oral, q8hr, PRN as needed for nausea/vomiting        Hospital Course   Hospital Course   CC: recurrent epistaxis    HPI:  68yo WF with chronic recurrent epistaxis and HTN presents to the ED with continue issues with nosebleeds. She states that she attempted to call her outpatient ENT as she has had retained packing for several days. He refused to see the patient as he was not the one to place the packing and recommended she follow up in the ED that placed it. In the ED she is stable, ENT on call recommended pulling packing and placing FlowSeal. Unfortunately the hospital is out. ENT on call states he will eval the patient in the morning and attempt to remove packing himself. If if continues to bleed she will likely require an IR procedure. Of note  she had a vessel ligation procedure several years ago. We are attempting to get records from [facility]. Currently there is no bleeding. She has been started on some empiric Augmentin since packing remains in place greater than 72 hours. She is afebrile. The hospitalist group was asked to observe until can have formal ENT eval. ENT was able to remove packing on 2/24 and placed thrombin matrix intranasally.  No further bleeding noted. Patient feeling well and without complaint except some mild nasal discomfort. She will follow up with ENT this week and needs BP check with PCP in 2 weeks.     Time to discharge 31 minutes.        Physical Examination      Vital Signs (last 24 hrs)_____  Last Charted___________  Temp Oral     36.5 DegC  (FEB 26 08:09)  Heart Rate Peripheral   75 bpm  (FEB 26 08:09)  Resp Rate         20 br/min  (FEB 25 11:00)  SBP      125 mmHg  (FEB 26 08:09)  DBP      78 mmHg  (FEB 26 08:09)  SpO2      96 %  (FEB 26 08:09)     General:  Alert and oriented, No acute distress.    Eye:  Pupils are equal, round and reactive to light, Extraocular movements are intact, Normal conjunctiva.    HENT:  Normocephalic, Oral mucosa is moist.    Neck:  Supple, Non-tender, No lymphadenopathy.    Respiratory:  Lungs are clear to auscultation, Respirations are non-labored, Breath sounds are equal, Symmetrical chest wall expansion.    Cardiovascular:  Normal rate, Regular rhythm, No murmur, No gallop, Good pulses equal in all extremities, No edema.    Gastrointestinal:  Soft, Non-tender, Non-distended, Normal bowel sounds, No organomegaly.    Musculoskeletal:  Normal range of motion, No deformity, Normal gait.    Integumentary:  Warm, Dry, Intact.    Neurologic:  Alert, Oriented, No focal deficits.    Psychiatric:  Cooperative, Appropriate mood & affect.       Discharge Plan   Diagnosis     Essential hypertension (EBU26-WN I10).     Recurrent epistaxis ([id number] R04.0).

## 2022-04-30 NOTE — ED PROVIDER NOTES
Patient:   Sandor Rea            MRN: [id number]            FIN: [id number]               Age:   67 years     Sex:  Female     :  1950   Associated Diagnoses:   Recurrent epistaxis; Essential hypertension   Author:   Radha Mancia MD      Basic Information   Time seen: Date & time 2018 11:24:00.   History source: Patient.   Arrival mode: Private vehicle.   History limitation: None.   Additional information: Patient's physician(s): Dakin MD, Jeyson Walker MD , Sandrita VICENTE, Chief Complaint from Nursing Triage Note : Chief Complaint   2018 11:21 CST      Chief Complaint           pt. here for removal of nasal packing to rt. nare d/t epitaxis, pt. went to ENT after first packing fell out and it was cauterized and then another tube was added and was told ED put tube in and must remove it. Hx HTN, meds taken    .      History of Present Illness   The patient presents with 68 yo F with multiple visits to the ED, one admission this month for epistaxis presents with nasal packing in right side nostril placed here 2 days ago.  PT presents to have it removed, states her ENT told her to come to the ED to have it removed. A [place] pac  and   I, Dr. Mancia, assumed care of this patient at 1233.    67 year old female with hx of Anxiety and HTN presents to the ED with nasal packing in the right nare placed here 2 days ago.  Reports that she spoke with her PCP and her ENT and was told to come to the ED to have the packing removed as opposed as the ENT, because she was told she may need to be monitored when it is removed. [facility]..  The onset was 2  days ago.  The course/duration of symptoms is fluctuating in intensity.  Location: Right nare. The character of symptoms is bleeding.  The degree at onset was moderate.  The degree at present is none.  Risk factors consist of hypertension and age.  Therapy today: none.  Associated symptoms: none.  Additional history:  none.        Review of Systems   Constitutional symptoms:  No fever, no chills.    Skin symptoms:  No jaundice, no rash.    Eye symptoms:  Vision unchanged, No blurred vision,    ENMT symptoms:  Nose: Bleeding.   Respiratory symptoms:  No shortness of breath No orthopnea , no cough, no sputum production.    Cardiovascular symptoms:  No chest pain, no palpitations, no diaphoresis, no peripheral edema.    Gastrointestinal symptoms:  No abdominal pain, no nausea, no vomiting, no diarrhea, no constipation.    Genitourinary symptoms:  No dysuria, no hematuria.    Neurologic symptoms:  No headache, no dizziness.    Hematologic/Lymphatic symptoms:  Bleeding tendency negative,    Allergy/immunologic symptoms:  No impaired immunity,              Additional review of systems information: All other systems reviewed and otherwise negative.      Health Status   Allergies:    Allergic Reactions (Selected)  Severity Not Documented  Codeine- Rash.  Percodan- Rash.  Sulfa drugs- Rash..   Medications:  (Selected)   Prescriptions  Prescribed  Percocet 5/325 oral tablet: 1 tab(s), Oral, q4hr, PRN PRN for pain, X 3 day(s), # 18 tab(s), 0 Refill(s), Pharmacy: [facility] [id number]  Documented Medications  Documented  Zofran 4 mg oral tablet: 4 mg = 1 tab(s), Oral, q8hr, PRN PRN as needed for nausea/vomiting, 0 Refill(s)  alPRAzolam 0.5 mg oral tablet: 0.5 mg = 1 tab(s), Oral, TID, 0 Refill(s)  amlodipine-benazepril 5 mg-10 mg oral capsule: 1 cap(s), Oral, Daily, 0 Refill(s)  cloniDINE 0.1 mg oral tablet: PRN for BP > 160/100, 0 Refill(s)  .   Immunizations: Per nurse's notes.      Past Medical/ Family/ Social History   Medical history: HTN, Anxiety.   Surgical history:    No active procedure history items have been selected or recorded.  .   Family history: Not significant.   Social history: Not significant.      Physical Examination               Vital Signs      Vital Signs (last 24 hrs)_____  Last Charted___________  Heart Rate  Peripheral   100 bpm  (FEB 23 11:21)  SBP      H 164mmHg  (FEB 23 11:21)  DBP      H 95mmHg  (FEB 23 11:21)  SpO2      97 %  (FEB 23 11:21)  .   Vital Signs   2/23/2018 12:00 CST      Oxygen Therapy            Room air    2/23/2018 11:21 CST      Temperature Temporal Artery               36.5 DegC                             Peripheral Pulse Rate     100 bpm                             SpO2                      97 %                             Oxygen Therapy            Room air                             Systolic Blood Pressure   164 mmHg  HI                             Diastolic Blood Pressure  95 mmHg  HI  .   Measurements   2/23/2018 11:21 CST      Weight Dosing             89.5 kg                             Weight Measured and Calculated in Lbs     197.31 lb                             Weight Estimated          89.5 kg                             Height/Length Dosing      172.72 cm                             Height/Length Estimated   172.72 cm                             Body Mass Index Estimated 30 kg/m2  .   Basic Oxygen Information   2/23/2018 12:00 CST      Oxygen Therapy            Room air    2/23/2018 11:21 CST      SpO2                      97 %                             Oxygen Therapy            Room air  .   General:  Alert, no acute distress.    Skin:  Warm, dry, no pallor, Not pale,    Head:  Normocephalic, atraumatic.    Neck:  Supple, trachea midline.    Eye:  Normal conjunctiva.   Ears, nose, mouth and throat:  Oral mucosa moist, Nose: Right naris, nasal packing in place. Hemostatic at this time, Throat: no oropharyngeal bleeding.    Cardiovascular:  Regular rate and rhythm, Normal peripheral perfusion, No edema No murmur    Respiratory:  Lungs are clear to auscultation, respirations are non-labored.    Gastrointestinal:  Soft, Nontender    Gastrointestinal:  Soft, Nontender, Non distended, Normal bowel sounds.        Neurological:  Alert and oriented to person, place, time, and situation.    Psychiatric:  Cooperative.      Medical Decision Making   Documents reviewed:  Emergency department nurses' notes.   Results review:  Lab results : Lab View   2/23/2018 13:01 CST      PT                        13.8 second(s)                             INR                       1.03                             PTT                       27.5 second(s)                             WBC                       12.9 x10(3)/mcL  HI                             RBC                       4.75 x10(6)/mcL                             Hgb                       13.4 gm/dL                             Hct                       40.3 %                             Platelet                  223 x10(3)/mcL                             MCV                       84.8 fL                             MCH                       28.2 pg                             MCHC                      33.3 gm/dL                             RDW                       13.1 %                             MPV                       11.4 fL                             Abs Neut                  9.42 x10(3)/mcL  HI                             Neutro Auto               73 %  NA                             Lymph Auto                16 %                             Mono Auto                 9 %  NA                             Eos Auto                  1 %  NA                             Abs Eos                   0.1 x10(3)/mcL                             Basophil Auto             0 %  NA                             Abs Neutro                9.42 x10(3)/mcL  HI                             Abs Lymph                 2.1 x10(3)/mcL                             Abs Mono                  1.2 x10(3)/mcL                             Abs Baso                  0.0 x10(3)/mcL    , Interpretation Labs unremarkable.       Reexamination/ Reevaluation   Notes: Patient referred to ED by her ENT as reportedly felt uncomfortable managing as an outpatient. Patient with no acutive complaints  at this time; however, concern if simply remove rhino rocket packing, that she will rebleed and require additional packing/intranasal trauma which may lead to further prolonged bleeding. She reports prior surgery for refractory epistaxis, procedure about 4 years ago. No records of this available at this time, but sounds like potential sphenopalatine artery ligation based on patient's description. Discussed with ENT on call, Dr. Cordoba, who recommended removing packing, repack with FloSeal hemostatic matrix; however, unavailable at this facility at this time. Discussed with Dr. Cordoba who advises admit for observation, start antibiotics for retained packing. If rebleeds when packing removed in AM, will plan for IR consultation to IR for embolization. Will attempt to get prior surgical records. Findings and plan discussed with the patient, and she is agreeable to admission at this time.   .      Impression and Plan   Diagnosis   Recurrent epistaxis (WVC34-BJ R04.0)   Essential hypertension (OWX26-DA I10)      Calls-Consults   -  Dakin MD, Denise, ENT, did not call back.    -  2/23/2018 13:39:00 , Adalid Garcia MD, Austin VILLEGAS, ENT, recommends remove packing, pack with FloSeal, admit for observation, if rebleeds, plan for IR consultation for embolization.    Plan   Counseled: Patient, Regarding diagnosis, Regarding diagnostic results, Regarding treatment plan, Patient indicated understanding of instructions.    Notes: I, Sebastian Delcid, acted solely as a scribe for and in the presence of Dr. Mancia who performed the service., I, Radha Mancia, have independently performed the history, physical, medical decision making and procedures as documented above and agree with the scribe's documentation. .

## 2022-04-30 NOTE — CONSULTS
DATE OF CONSULTATION:  02/18/2018    ATTENDING PHYSICIAN:  Ayde Carranza MD  CONSULTING PHYSICIAN:  Austin Cordoba Jr., MD    REASON FOR CONSULTATION:  Epistaxis.    IMPRESSION/PLAN:  I discussed with Dr. Jerri De La Vega with Sandor Rea and her case.  She has had multiple episodes of epistaxis and has been evaluated in the emergency room on a couple of occasions.  When she was evaluated here today, there was no evidence of any active bleeding.  She is a patient of Dr. Dakin, and I spoke with Dr. Mckeon, who is covering for Dr. Dakin today.  Dr. Mckeon recommended placing an anterior pack on the right side and sending her out, they can followup with her in clinic.  This was performed in the emergency room, and again there was no evidence of any significant or active bleeding afterwards.     Despite this, the patient was having nausea and did not want to take anything by mouth and felt dehydrated.  She also noted she was having trouble controlling her blood pressure, systolic 150 and that could potentially elicit some bleeding when she was at home.  For these reasons, she was admitted for medical management of blood pressure, rehydration and also for observation for any further bleeding.       I discussed with nursing since she has been on the floor.  There has been no evidence of any active epistaxis out of the front of her nose or post-nasally or down the back of her throat.  If she continues to remain hemostatic without any evidence of bleeding, I do not have any problem with her discharging tomorrow.  Again following up with Dr. Dakin as an outpatient for management of that pack.  If she bleeds at any point throughout the night, please do not hesitate to give me a call, I will be happy to evaluate and get her taken care of in the acute setting.     Thank you for this consultation.        ______________________________  MD GAYATHRI Eugene Jr./PATT  DD:  02/18/2018  Time:  06:51PM  DT:   02/19/2018  Time:  06:30AM  Job #:  [id number]

## 2022-04-30 NOTE — CONSULTS
DATE OF CONSULTATION:  2018    ATTENDING PHYSICIAN:  Mansoor Steven MD  CONSULTING PHYSICIAN:  Austin Cordoba Jr., MD    CHIEF COMPLAINT:  Recurrent epistaxis.    HISTORY OF PRESENT ILLNESS:  The patient is a pleasant 67-year-old woman with a past medical history of hypertension and recurrent epistaxis.  The last time she had substantial epistaxis was approximately 5 years ago.  She reports Dr. Kim Dakin took her to the operating room here at [facility] and a vessel ligation or cautery was performed.  She is unsure of the name of that vessel and I am unable to find operative reports in the system.  We are attempting to get these records.       With that being said, she has been in and out of the ER multiple times now here at [facility].  Packing is placed and then she is told to follow up with her outpatient ENT, which is Dr. Dakin.  When she tried to go to have the packing removed, Dr. Dakin's office essentially recommended that she come back to the ER for packing removal.     When I got this call, I did not feel comfortable removing the packing in the ER and just sending her home.  She had only had the packing in 2 days at this point in time and was hemostatic.  Given the difficulty of the weekend time frame and her not having great followup, I did not want another readmission.  For that reason, we admitted her with plans on removing the packing a day later in the hospital and transferring her over to Surgiflo Gel-Foam thrombin foam, which is dissolvable.     Since being in the hospital, she remains hemostatic.    REVIEW OF SYSTEMS:  Otherwise negative.    PAST MEDICAL HISTORY:  Hypertension and recurrent epistaxis.    SURGICAL HISTORY:  History of endoscopic cautery or vessel ligation, .    ALLERGIES:  To Percodan, codeine, and sulfa drugs.    SOCIAL HISTORY:  Nonsmoker, nondrinker.    FAMILY HISTORY:  No significant past family history.    PHYSICAL EXAMINATION:  GENERAL:  Alert  and oriented, in no acute distress.   ENT:  The patient has Rhino Rocket in the right side of the nose with no anterior or posterior bleeding at this point.  There is no bleeding on the left side of the nose either.  There is no postnasal drainage in the oropharynx.  No old blood or fresh blood.   EARS:  Pinnae are unremarkable.  External auditory canals are without otorrhea.    PROCEDURE:  The cuff was deflated from the Rhino Rocket and it was removed without issue.  She has a septal deviation off to the right spur, nearly abutting the head of the middle turbinate.  The mucosa was abraded above this, and this is where the majority of the bleeding started to ooze from.  I did not see any pulsatile bleeding.  Endoscopy with flexible scope did not reveal any posterior nasal bleeding or any other pulsatile bleeding that I could appreciate.  I suctioned some of the clot out of the nose and this friable area was applied with Floseal.  The Floseal filled the entire nasal cavity from posteriorly to anteriorly, both underneath and on top of the spur with excellent hemostasis.  She was observed for 10 minutes afterwards without any anterior or posterior nasal bleeding.  She tolerated it well.    IMPRESSION:  Right-sided epistaxis, status post Surgiflo application.     The packing I placed today is dissolvable.  I was happy with hemostasis that I observed for the 10-15 minutes after I placed it.  We will see how she does tonight and tomorrow.  I would say that if she does not bleed throughout the weekend, she can discharge home, and I would see her as an outpatient in followup within the next 1-2 weeks in my office so that I could then repeat evaluate her with the scope and make sure there is no underlying mass or lesion to account for this recurrent epistaxis, although I do have little suspicion for something ominous.       Otherwise, if she does bleed through this packing tonight or tomorrow, I think we should proceed with  an interventional procedure for embolization of the vessels that feed the nose given the fact that she has already had a surgical ligation or cautery of a major vessel in the nose per the patient's history.  I will continue to follow along.  Please do not hesitate to contact me if there are questions or concerns.        ______________________________  MD GAYATHRI Eugene Jr./AASHISH  DD:  02/24/2018  Time:  01:59PM  DT:  02/24/2018  Time:  02:27PM  Job #:  [id number]

## 2022-04-30 NOTE — DISCHARGE SUMMARY
* Final Report *  Hospitalist Progress Note     Patient:   Sandor Rea            MRN: [id number]            FIN: [id number]               Age:   67 years     Sex:  Female     :  1950   Associated Diagnoses:   None   Author:   Ayde Carranza MD      Basic Information   CC:none    HPI:67 year old  female presents to the emergency department with complaints of nosebleed over the past week.  Patient was seen in the ED ×2 episodes in [facility] and was seen by her PCP for the same problems as well.  She reports she was prescribed clonidine from one her ED visits at [facility] to help with her blood pressure and the nosebleeds.  She denies any injury or trauma and is not on any blood thinners or anticoagulation therapy.  She denies any chronic medical problems and reports no daily chronic medication.  In reviewing her medical record patient was seen in the ED at [facility] 2018, followed up with her PCP and then subsequently return to the ED at [facility] on 2018 for continued nosebleeds, vomiting blood, and associated nausea.  Patient reports she has not been taking the clonidine as she was instructed to stop taking the medication per her PCP.  She denies any past medical history of hypertension.  Denies any dizziness, syncopal episodes, bright red bleeding per rectum, black tarry stools, hematochezia, or melena.  Labwork done in the ED revealed Sodium 138, potassium 3.8, chloride 103; glucose 126, BUN 15.0, creatinine 0.62; bili level liver enzymes unremarkable; coagulation unremarkable; WBCs 9.4, H&H 12.9/39.4, platelets 204.  Patient reported fever and high blood pressure at home however she has been afebrile with all of her ED visits and is currently afebrile today with normotensive blood pressure.  ED provider placed nasal packing to right nare and spoke with ENT on call for Dr. Dakin with reports to instruct pt to call office in the am for appointment. Attempt was  made to d/c patient , however, she reports nausea and unable to drink secondary to the nausea. Pt is admitted to the hospitalist services for further management. ENT consulted per ED.      Today's information: Patient seen and examined no more episodes of epistaxis since admission abdominal pain nausea much better      Review of Systems   As documented all other systems reviewed and -ve      Health Status   Allergies:    Allergic Reactions (All)  Severity Not Documented  Codeine- Rash.  Percodan- Rash.  Sulfa drugs- Rash.  Canceled/Inactive Reactions (All)  No Known Allergies,    Allergies (3) Active Reaction  codeine rash  Percodan rash  sulfa drugs rash     Current medications:  (Selected)   Inpatient Medications  Ordered  Phenergan: 12.5 mg, form: Injection, IV Push, q4hr PRN for nausea/vomiting, first dose 02/18/18 15:17:00 CST  Sodium Chloride 0.9% 1,000 mL: 1,000 mL, 1,000 mL, IV, 100 mL/hr, start date 02/18/18 15:17:00 CST  Xanax 0.25 mg oral tablet: 0.25 mg, form: Tab, Oral, BID PRN for as needed for anxiety, first dose 02/18/18 14:30:00 CST  Zofran: 4 mg, form: Injection, IV Push, q4hr PRN for nausea, first dose 02/18/18 15:17:00 CST  acetaminophen: 1,000 mg, form: Tab, Oral, q6hr PRN for pain, mild, first dose 02/18/18 15:17:00 CST  acetaminophen: 650 mg, form: Tab, Oral, q6hr PRN for fever, first dose 02/18/18 15:17:00 CST, > 38.1 degrees Celsius (100.6 degrees Fahrenheit)  Prescriptions  Prescribed  Norvasc 2.5 mg oral tablet: 2.5 mg = 1 tab(s), Oral, Daily, # 30 tab(s), 0 Refill(s)  Zofran 4 mg oral tablet: 4 mg = 1 tab(s), Oral, q8hr, PRN PRN nausea/vomiting, # 15 tab(s), 0 Refill(s),    Medications (6) Active  Scheduled: (0)  Continuous: (1)  sodium chloride 0.9% 1,000 mL  1,000 mL, IV, 100 mL/hr  PRN: (5)  acetaminophen 325 mg Tab  650 mg 2 tab(s), Oral, q6hr  acetaminophen 500 mg Tab  1,000 mg 2 tab(s), Oral, q6hr  alprazolam 0.25 mg Tab UD  0.25 mg 1 tab(s), Oral, BID  ondansetron 2 mg/mL inj - 2mL   4 mg 2 mL, IV Push, q4hr  promethazine 25 mg/mL Inj  12.5 mg 0.5 mL, IV Push, q4hr        Physical Examination      Vital Signs (last 24 hrs)_____  Last Charted___________  Temp Oral     36.7 DegC  (FEB 19 07:25)  Heart Rate Peripheral   80 bpm  (FEB 19 07:25)  Resp Rate         20 br/min  (FEB 19 07:25)  SBP      H 148mmHg  (FEB 19 07:25)  DBP      88 mmHg  (FEB 19 07:25)  SpO2      95 %  (FEB 19 07:25)     General:  Alert and oriented.    Eye:  Pupils are equal, round and reactive to light.    HENT:  Normocephalic.    Neck:  Supple, Non-tender.    Respiratory:  Lungs are clear to auscultation.    Cardiovascular:  Normal rate.    Gastrointestinal:  Soft, Non-tender.    Integumentary:  Warm, Dry.    Neurologic:  Alert, Oriented.    Psychiatric:  Cooperative.       Review / Management   Results review:     Labs (Last four charted values)  Glucose              94 (FEB 19) H 126 (FEB 18) , All Results   2/19/2018 5:10 CST       WBC                       7.8 x10(3)/mcL                             RBC                       3.85 x10(6)/mcL  LOW                             Hgb                       10.9 gm/dL  LOW                             Hct                       32.6 %  LOW                             Platelet                  171 x10(3)/mcL                             MCV                       84.7 fL                             MCH                       28.3 pg                             MCHC                      33.4 gm/dL                             RDW                       13.1 %                             MPV                       11.8 fL  .    Radiology results   Rad Results Last 72 Hrs   Accession: [id number]  Order: XR Abdomen Flat and Erect  Report Dt/Tm: 02/18/2018 14:24  Report:   HISTORY: Nausea and vomiting.     EXAM: XR Abdomen Flat and Erect.      PRIOR STUDY: None.     FINDINGS: Radiographic examination of the abdomen in supine and  upright views demonstrates a nonobstructed bowel gas pattern.  There is  no free air. There is no pneumatosis. There is no air-fluid level.  There is no pathologic calcification.     IMPRESSION: Nonobstructed bowel gas pattern.      , Rad Results Last 7 days   Accession: [id number]  Order: XR Abdomen Flat and Erect  Report Dt/Tm: 02/18/2018 14:24  Report:   HISTORY: Nausea and vomiting.     EXAM: XR Abdomen Flat and Erect.      PRIOR STUDY: None.     FINDINGS: Radiographic examination of the abdomen in supine and  upright views demonstrates a nonobstructed bowel gas pattern. There is  no free air. There is no pneumatosis. There is no air-fluid level.  There is no pathologic calcification.     IMPRESSION: Nonobstructed bowel gas pattern.            Impression and Plan   Epistaxis  Status post nasal packing placement  -no more episode of bleeding since admission  -Possible discharge later today     Nausea with vomiting  -Resolved  Flat and erect was done that was negative    Abdominal pain-   -Resolved    HTN-essential  -currently normotensive  -will add2. 5 mg norvasc daily  -d/c clonidine    Anxiety  -PRN xanax BID      FULL CODE STATUS  GI/DVT PROPHYLAXIS  PCP: KRIS LAM     Patient needs to follow-up outpatient with her ENT doctor within couple of days     Result type: Progress Note-Physician  Result date: February 19, 2018 9:55 CST  Result status: Auth (Verified)  Result title: Hospitalist Progress Note  Performed by: Ayde Carranza MD on February 19, 2018 10:09 CST  Verified by: Ayde Carranza MD on February 19, 2018 10:09 CST  Encounter info: [id number], [facility], Observation, 2/18/2018 - 2/19/2018

## 2022-04-30 NOTE — CONSULTS
Patient:   Sandor Rea            MRN: [id number]            FIN: [id number]               Age:   69 years     Sex:  Female     :  1950   Associated Diagnoses:   None   Author:   Gilberto Smith MD      Chief Complaint   2019 17:44 CST     nose bleed since 1640; denies blood thinners, denies trauma     Patient is a 69-year-old who is had a history of to 3 separate episodes over the past 10 years of severe nosebleeds requiring packing and possibly even vessel ligation about 10 years ago in [facility].  He was reading her mail yesterday not do any started having bleeding from the right side of her nose.  She then called 911 and was transferred over here over a pack was placed in the nose down in the emergency room.      Health Status   Allergies:    Allergic Reactions (Selected)  Severity Not Documented  Codeine- Rash.  Opioid-like analgesics- Hives.  Percodan- Rash.  Sulfa drugs- Rash.,    Allergies (4) Active Reaction  codeine rash  opioid-like analgesics Hives  Percodan rash  sulfa drugs rash     Current medications:  (Selected)   Inpatient Medications  Ordered  Ofirmev: 1,000 mg, form: Infusion, IV Piggyback, q6hr PRN for pain, Infuse over: 15 minute(s), Order duration: 24 hr, first dose 19 0:48:00 CST, stop date 19 0:47:00 CST  Zofran 2 mg/mL injectable solution: 4 mg, form: Injection, IV Push, q4hr PRN for nausea, first dose 19 1:02:00 CST  lisinopril 10 mg oral tablet: 10 mg, form: Tab, Oral, BID, first dose 19 9:00:00 CST  morphine 4 mg/mL preservative-free intravenous solution: 2 mg, form: Injection, IV Push, q4hr PRN for pain, severe, first dose 19 0:01:00 CST, ( > 7 on pain scale)  Documented Medications  Documented  Probiotic Formula (Bacillus Coagulans) oral capsule: 1 cap(s), Oral, Daily, # 30 cap(s), 0 Refill(s)  Vitamin B12 100 mcg oral tablet: 100 mcg = 1 tab(s), Oral, Daily, # 90 tab(s), 0 Refill(s)  benazepril 10 mg oral  tablet: 10 mg = 1 tab(s), Oral, BID  cinnamon 500 mg oral capsule: 500 mg = 1 cap(s), Oral, Daily, 0 Refill(s)  metformin 500 mg oral tablet: 500 mg = 1 tab(s), Oral, Daily,    Medications (4) Active  Scheduled: (1)  lisinopril 10 mg Tab UD  10 mg 1 tab(s), Oral, BID  Continuous: (0)  PRN: (3)  acetaminophen  1,000 mg 100 mL, IV Piggyback, q6hr  morphine 4 mg/mL preservative-free Soln  2 mg 0.5 mL, IV Push, q4hr  ondansetron 2 mg/mL inj - 2mL  4 mg 2 mL, IV Push, q4hr        Histories   Past Medical History:    No active or resolved past medical history items have been selected or recorded.   Family History:    No family history items have been selected or recorded.   Social History        Social & Psychosocial Habits    Alcohol  12/21/2019  Use: Never    Substance Use  12/21/2019  Use: Never    Tobacco  12/21/2019  Use: Never (less than 100 in l    Patient Wants Consult For Cessation Counseling N/A    Abuse/Neglect  12/21/2019  SHX Any signs of abuse or neglect No    Feels unsafe at home: No    Safe place to go: Yes  .        Physical Examination   Vital Signs   12/21/2019 8:01 CST      Heart Rate Monitored      74 bpm    12/21/2019 8:00 CST      Oxygen Therapy            Room air    12/21/2019 7:37 CST      Temperature Oral          36.7 DegC                             Temperature Oral (calculated)             98.06 DegF                             Peripheral Pulse Rate     81 bpm                             SpO2                      97 %                             Systolic Blood Pressure   144 mmHg  HI                             Diastolic Blood Pressure  84 mmHg                             Mean Arterial Pressure, Cuff              104 mmHg    12/21/2019 4:33 CST      Peripheral Pulse Rate     83 bpm                             SpO2                      95 %                             Systolic Blood Pressure   130 mmHg                             Diastolic Blood Pressure  76 mmHg                              Mean Arterial Pressure, Cuff              94 mmHg    12/21/2019 4:00 CST      Temperature Oral          36.5 DegC                             Temperature Oral (calculated)             97.70 DegF                             Heart Rate Monitored      83 bpm                             Oxygen Therapy            Room air    12/21/2019 1:08 CST      Heart Rate Monitored      86 bpm                             Oxygen Therapy            Room air    12/21/2019 0:17 CST      Temperature Oral          36.5 DegC                             Temperature Oral (calculated)             97.70 DegF                             Peripheral Pulse Rate     85 bpm                             SpO2                      96 %                             Systolic Blood Pressure   133 mmHg                             Diastolic Blood Pressure  85 mmHg                             Mean Arterial Pressure, Cuff              101 mmHg    12/20/2019 23:19 CST     Peripheral Pulse Rate     86 bpm                             Heart Rate Monitored      86 bpm                             Respiratory Rate          21 br/min                             SpO2                      97 %                             Oxygen Therapy            Room air                             Systolic Blood Pressure   142 mmHg  HI                             Diastolic Blood Pressure  69 mmHg                             Mean Arterial Pressure, Cuff              93 mmHg    12/20/2019 21:16 CST     Peripheral Pulse Rate     87 bpm                             Heart Rate Monitored      88 bpm                             Respiratory Rate          15 br/min                             SpO2                      96 %                             Oxygen Therapy            Room air                             Systolic Blood Pressure   150 mmHg  HI                             Diastolic Blood Pressure  70 mmHg                             Mean Arterial Pressure, Cuff              97 mmHg     12/20/2019 19:13 CST     Peripheral Pulse Rate     101 bpm  HI                             Heart Rate Monitored      100 bpm                             Respiratory Rate          15 br/min                             SpO2                      97 %                             Oxygen Therapy            Room air                             Systolic Blood Pressure   145 mmHg  HI                             Diastolic Blood Pressure  88 mmHg                             Mean Arterial Pressure, Cuff              107 mmHg    12/20/2019 18:03 CST     Peripheral Pulse Rate     101 bpm  HI                             Heart Rate Monitored      102 bpm  HI                             Respiratory Rate          24 br/min                             SpO2                      98 %                             Oxygen Therapy            Room air                             Systolic Blood Pressure   162 mmHg  HI                             Diastolic Blood Pressure  93 mmHg  HI                             Mean Arterial Pressure, Cuff              116 mmHg    12/20/2019 17:55 CST     Peripheral Pulse Rate     102 bpm  HI                             Respiratory Rate          18 br/min                             SpO2                      99 %                             Oxygen Therapy            Room air                             Systolic Blood Pressure   164 mmHg  HI                             Diastolic Blood Pressure  85 mmHg                             Mean Arterial Pressure, Cuff              111 mmHg    12/20/2019 17:44 CST     Temperature Temporal Artery               36.6 DegC                             Peripheral Pulse Rate     112 bpm  HI                             Respiratory Rate          18 br/min                             SpO2                      99 %                             Oxygen Therapy            Room air                             Systolic Blood Pressure   186 mmHg  HI                             Diastolic  Blood Pressure  119 mmHg  HI        Vital Signs (last 24 hrs)_____  Last Charted___________  Temp Oral     36.7 DegC  (DEC 21 07:37)  Heart Rate Peripheral   81 bpm  (DEC 21 07:37)  Resp Rate         21 br/min  (DEC 20 23:19)  SBP      H 144mmHg  (DEC 21 07:37)  DBP      84 mmHg  (DEC 21 07:37)  SpO2      97 %  (DEC 21 07:37)  Weight      85.8 kg  (DEC 21 00:22)  Height      172 cm  (DEC 21 00:22)  BMI      29  (DEC 21 00:22)     The patient is a very anxious female with a Rhino pack in the right nostril.  She was extremely verbal about telling me how things should be done and that she screams a lot when people do touch her.  I was able to remove the pack and anesthetized her with topical Hurricaine spray and Prateek-Synephrine.  A 30 degree sinus endoscopy was placed in the right nostril and I can easily identify a vessel on the right mid septum.  I cauterized this area with silver nitrate and it started oozing a little bit so I replaced the Rhino pack.  I suspect that Rhino pack will be in for the next 2 to 3 days.      Review / Management   Results review:     Labs (Last four charted values)  WBC                  9.8 (DEC 21) 10.4 (DEC 20)   Hgb                  12.2 (DEC 21) 13.2 (DEC 20)   Hct                  38.4 (DEC 21) 40.7 (DEC 20)   Plt                  183 (DEC 21) 202 (DEC 20)   Na                   140 (DEC 21)   K                    4.1 (DEC 21)   CO2                  27.0 (DEC 21)   Cl                   104 (DEC 21)   Cr                   0.66 (DEC 21)   BUN                  17.0 (DEC 21)   Glucose Random       H 118 (DEC 21)   PT                   13.4 (DEC 21) 12.9 (DEC 20)   INR                  1.0 (DEC 21) 1.0 (DEC 20)   PTT                  31.0 (DEC 21) 29.8 (DEC 20) .       Impression and Plan   Epistaxis    Patient has had multiple episodes of nosebleed and I will probably leave this nasal packing for the next 2 to 3 days depending on how much oozing she has.

## 2022-05-03 NOTE — HISTORICAL [facility] CERNER
This is a historical note converted from [employer]. Formatting and pictures may have been removed.  Please reference [employer] for original formatting and attached multimedia. Chief Complaint  nose bleed since 1640; denies blood thinners, denies trauma  History of Present Illness  Mrs. Rea is a 67-year-old  female with a past medical history of chronic recurrent epistaxis and hypertension presents to the ED with epistaxis to?right nares?since 4pm today. Pt states that she was going to get her mail and started bleeding from her right nares. She held pressure but bleeding continued? so she called EMS.? Patient reports that the last time she had a substantial epistaxis was approximately 7 years ago and was taken to the OR and a vessel ligation or cautery was performed.? She was admitted?in February of last year?for epistaxis and was seen by Dr. Cordoba and had nasal packing with resolution?and had routine follow-up with ENT. ?Patient states since then she has had no further bleeding.  ?  Initial ED vital signs:Temperature 36.4, heart rate 112, respirations 18, blood pressure 186/119, oxygenation 99% room air.? CBC and coags were unremarkable.? Rhino Rocket was placed in the emergency department, and patient is still having some mild drainage in the back of her throat which is causing her to have nausea and vomiting and is therefore being admitted to the hospitalist service for further management.  Review of Systems  All review of systems?obtained and negative except as stated above  Physical Exam  Vitals & Measurements  T:?36.5? ?C (Oral)? TMIN:?36.5? ?C (Oral)? TMAX:?36.6? ?C (Temporal Artery)? HR:?86(Monitored)? RR:?21? BP:?133/85? SpO2:?96%? WT:?85.8?kg? BMI:?29?  General:?Awake, alert, oriented in no acute distress  HEENT:?Normocephalic atraumatic,?pupils equal and reactive, nasal packing to right nare  Cardiovascular: Regular rate and rhythm, normal peripheral perfusion  Respiratory: Lungs clear to auscultation  bilaterally,?no tachypnea or accessory muscle use  Abdomen:?Soft, nontender, nondistended?with positive bowel sounds  Extremities: Warm and well perfused, no clubbing or cyanosis  Neuro:?Grossly without focal deficits  Skin:?Warm, dry and intact  Psych:?Cooperative  ?  Assessment/Plan  Acute Epistaxis of the right nares s/p nasal packing  Nausea and vomiting, secondary to above  Accelerated HTN  Diabetes Mellitus, Type II  ?  PLAN:  - NPO.  - Dr. Cordoba consulted  - Accu checks ACHS with SS #2 protocol.  - Hold home meds while NPO  - CBC, BMP in AM  ?  I, Bebe Price, BANP-C have reviewed and discussed this case with Dr. Schuster  ?   DVT prophylaxis: SCDs  CODE STATUS: Full  PCP:?Dr. Remy  -----------  I, Britt Schuster MD, reviewed the NP documentation, treatment plan, and medical decision making; and I had face-to-face time with this patient.??Labs and imaging were reviewed and I agree with history, physical and medical decision making as detailed above.  ?   Recurrent epistaxis. Bleeding currently controlled with packing. ENT consulted.   Problem List/Past Medical History  Chronic recurrent epistaxis  Hypertension  Diabetes mellitus  ?  Procedure/Surgical History  Control nasal hemorrhage, anterior, simple (limited cautery and/or packing) any method (2018)  Packing of Nasal Region using Packing Material (2018)   delivery  right arm sx  sx for nose bleeds   Medications  Inpatient  morphine 4 mg/mL preservative-free intravenous solution, 2 mg= 0.5 mL, IV Push, q4hr, PRN  Ofirmev, 1000 mg= 100 mL, IV Piggyback, q6hr, PRN  Zofran 2 mg/mL injectable solution, 4 mg= 2 mL, IV Push, q4hr, PRN  Home  benazepril 10 mg oral tablet, 10 mg= 1 tab(s), Oral, BID  cinnamon 500 mg oral capsule, 500 mg= 1 cap(s), Oral, Daily  metformin 500 mg oral tablet, 500 mg= 1 tab(s), Oral, Daily  Probiotic Formula (Bacillus Coagulans) oral capsule, 1 cap(s), Oral, Daily  Vitamin B12 100 mcg oral tablet, 100 mcg= 1 tab(s),  Oral, Daily  Allergies  Percodan?(rash)  codeine?(rash)  opioid-like analgesics?(Hives)  sulfa drugs?(rash)  Social History  Abuse/Neglect  No, No, Yes, 12/21/2019  Alcohol  Never, 12/21/2019  Substance Use  Never, 12/21/2019  Tobacco  Never (less than 100 in lifetime), N/A, 12/21/2019  Family History  Reviewed and noncontributory  Lab Results  Labs Last 24 Hours?  ?Hematology/Coagulation?   PT: 12.9 second(s) (12/20/19 18:48:00)   INR: 1 (12/20/19 18:48:00)   PTT: 29.8 second(s) (12/20/19 18:48:00)   WBC: 10.4 x10(3)/mcL (12/20/19 23:32:00)   RBC: 4.6 x10(6)/mcL (12/20/19 23:32:00)   Hgb: 13.2 gm/dL (12/20/19 23:32:00)   Hct: 40.7 % (12/20/19 23:32:00)   Platelet: 202 x10(3)/mcL (12/20/19 23:32:00)   MCV: 88.5 fL (12/20/19 23:32:00)   MCH: 28.7 pg (12/20/19 23:32:00)   MCHC:?32.4 gm/dL?Low (12/20/19 23:32:00)   RDW: 12.4 % (12/20/19 23:32:00)   MPV: 11.7 fL (12/20/19 23:32:00)   Abs Neut: 8.74 x10(3)/mcL (12/20/19 23:32:00)   Neutro Auto: 84 % (12/20/19 23:32:00)   Lymph Auto: 12 % (12/20/19 23:32:00)   Mono Auto: 3 % (12/20/19 23:32:00)   Eos Auto: 0 % (12/20/19 23:32:00)   Abs Eos: 0 x10(3)/mcL (12/20/19 23:32:00)   Basophil Auto: 0 % (12/20/19 23:32:00)   Abs Neutro: 8.74 x10(3)/mcL (12/20/19 23:32:00)   Abs Lymph: 1.3 x10(3)/mcL (12/20/19 23:32:00)   Abs Mono: 0.3 x10(3)/mcL (12/20/19 23:32:00)   Abs Baso: 0 x10(3)/mcL (12/20/19 23:32:00)

## 2022-05-03 NOTE — HISTORICAL [facility]
This is a historical note converted from [employer]. Formatting and pictures may have been removed.  Please reference [facility] for original formatting and attached multimedia. Chief Complaint  pt. here for removal of nasal packing to rt. nare d/t epitaxis, pt. went to ENT after first packing fell out and it was cauterized and then another tube was added and was told ED put tube in and must remove it. Hx HTN, meds taken  History of Present Illness  CC: recurrent epistaxis  ?  HPI:  66yo WF with chronic recurrent epistaxis and HTN presents to the ED with continue issues with nosebleeds. She states that she attempted to call her outpatient?ENT as she has had retained packing for several days.? He refused to see the patient as he was not the one to place the packing and recommended she follow up in the ED that placed it. In the ED she is stable, ENT on call recommended pulling packing and placing FlowSeal.? Unfortunately the hospital is out.?ENT on call states he will eval the patient in the morning and attempt to remove packing himself.? If if continues to bleed she will likely require an IR procedure.? Of note she had a vessel ligation procedure several years ago.? We are attempting to get records from [facility] [place] [facility].? Currently there is no bleeding.? She has been started on some empiric Augmentin since packing remains in place greater than 72 hours. She is afebrile.? The hospitalist destin was asked to observe until can have formal ENT eval.  Review of Systems  Except as documented, all other systems reviewed and negative.  Physical Exam  Vitals & Measurements  T:?36.5? ?C (Temporal Artery)? HR:?98(Peripheral)? BP:?152/93? SpO2:?94%? WT:?89.5?kg? WT:?89.5?kg?  General: No acute distress, Awake, Alert, Oriented x3  HEENT: PERRL, EOMI, no scleral icterus, OP clear, R nare packing  Respiratory: CTA bilateral, no wheezes, rales, or rhonchi  Cardiovascular: Regular rate and rhythm, no murmurs, rubs or gallops, +s1/s2  Gastrointestinal:  soft, nontender, nondistended, +BS  Musculoskeletal: Normal range of motion, no pertinent deformity  Integumentary: clean, warm, dry, intact  Neurologic: CN 2 - 12 grossly intact, no acute deficit noted  Assessment/Plan  1.?Recurrent epistaxis  -ENT consulted  -to attempt to remove packing tomorrow  -if still bleeding may need IR  2.?Essential hypertension  -continue HTN meds  ?  ????DVT Prophylaxis: SCDs  ????Code Status: Full  ????PCP: Sandrita  ?   Time to admission greater than 71 minutes   Problem List/Past Medical History  Ongoing  No qualifying data  Historical  No qualifying data  Essential HTN  Recurrent Epistaxis  Procedure/Surgical History  Control nasal hemorrhage, anterior, simple (limited cautery and/or packing) any method (02/21/2018), Packing of Nasal Region using Packing Material (02/21/2018).  Medications  Inpatient  alPRAzolam 0.5 mg oral tablet, 0.5 mg= 1 tab(s), Oral, TID  amlodipine-benazepril 5 mg-10 mg oral capsule, 1 cap(s), Oral, Daily  Augmentin 875 mg-125 mg oral tablet, 875 mg= 1 tab(s), Oral, BID  cloniDINE 0.1 mg oral tablet, 0.1 mg= 1 tab(s), Oral, TID, PRN  Zofran 4 mg oral tablet, 4 mg= 1 tab(s), Oral, q8hr, PRN  Home  alPRAzolam 0.5 mg oral tablet, 0.5 mg= 1 tab(s), Oral, TID  amlodipine-benazepril 5 mg-10 mg oral capsule, 1 cap(s), Oral, Daily  cloniDINE 0.1 mg oral tablet  Percocet 5/325 oral tablet, 1 tab(s), Oral, q4hr, PRN  Zofran 4 mg oral tablet, 4 mg= 1 tab(s), Oral, q8hr, PRN  Allergies  Percodan?(rash)  codeine?(rash)  sulfa drugs?(rash)  Social History  Tobacco  Never smoker, 02/13/2018  Family History  Family history reviewed and negative  ?  Lab Results  Test Name Test Result Date/Time   INR 1.03 02/23/2018 13:01 CST   WBC 12.9 x10(3)/mcL (High) 02/23/2018 13:01 CST   Hgb 13.4 gm/dL 02/23/2018 13:01 CST   Hct 40.3 % 02/23/2018 13:01 CST   Platelet 223 x10(3)/mcL 02/23/2018 13:01 CST